# Patient Record
Sex: FEMALE | Race: WHITE | Employment: OTHER | URBAN - METROPOLITAN AREA
[De-identification: names, ages, dates, MRNs, and addresses within clinical notes are randomized per-mention and may not be internally consistent; named-entity substitution may affect disease eponyms.]

---

## 2017-04-02 ENCOUNTER — HOSPITAL ENCOUNTER (OUTPATIENT)
Age: 82
Setting detail: OBSERVATION
Discharge: HOME | End: 2017-04-03
Attending: EMERGENCY MEDICINE | Admitting: INTERNAL MEDICINE
Payer: MEDICARE

## 2017-04-02 ENCOUNTER — APPOINTMENT (OUTPATIENT)
Dept: CT IMAGING | Age: 82
End: 2017-04-02
Attending: EMERGENCY MEDICINE
Payer: MEDICARE

## 2017-04-02 DIAGNOSIS — G45.1 HEMISPHERIC CAROTID ARTERY SYNDROME: ICD-10-CM

## 2017-04-02 DIAGNOSIS — G45.9 TRANSIENT CEREBRAL ISCHEMIA, UNSPECIFIED TYPE: ICD-10-CM

## 2017-04-02 DIAGNOSIS — R29.898 RIGHT LEG WEAKNESS: Primary | ICD-10-CM

## 2017-04-02 LAB
ALBUMIN SERPL BCP-MCNC: 4.2 G/DL (ref 3.5–5)
ALBUMIN/GLOB SERPL: 1.3 {RATIO} (ref 1.1–2.2)
ALP SERPL-CCNC: 75 U/L (ref 45–117)
ALT SERPL-CCNC: 26 U/L (ref 12–78)
ANION GAP BLD CALC-SCNC: 7 MMOL/L (ref 5–15)
AST SERPL W P-5'-P-CCNC: 15 U/L (ref 15–37)
BASOPHILS # BLD AUTO: 0 K/UL (ref 0–0.1)
BASOPHILS # BLD: 0 % (ref 0–1)
BILIRUB SERPL-MCNC: 0.5 MG/DL (ref 0.2–1)
BUN SERPL-MCNC: 34 MG/DL (ref 6–20)
BUN/CREAT SERPL: 26 (ref 12–20)
CALCIUM SERPL-MCNC: 9.6 MG/DL (ref 8.5–10.1)
CHLORIDE SERPL-SCNC: 101 MMOL/L (ref 97–108)
CO2 SERPL-SCNC: 31 MMOL/L (ref 21–32)
CREAT SERPL-MCNC: 1.3 MG/DL (ref 0.55–1.02)
EOSINOPHIL # BLD: 0.3 K/UL (ref 0–0.4)
EOSINOPHIL NFR BLD: 2 % (ref 0–7)
ERYTHROCYTE [DISTWIDTH] IN BLOOD BY AUTOMATED COUNT: 13.4 % (ref 11.5–14.5)
GLOBULIN SER CALC-MCNC: 3.3 G/DL (ref 2–4)
GLUCOSE BLD STRIP.AUTO-MCNC: 116 MG/DL (ref 65–100)
GLUCOSE SERPL-MCNC: 122 MG/DL (ref 65–100)
HCT VFR BLD AUTO: 46 % (ref 35–47)
HGB BLD-MCNC: 15.1 G/DL (ref 11.5–16)
LYMPHOCYTES # BLD AUTO: 26 % (ref 12–49)
LYMPHOCYTES # BLD: 2.8 K/UL (ref 0.8–3.5)
MCH RBC QN AUTO: 32.1 PG (ref 26–34)
MCHC RBC AUTO-ENTMCNC: 32.8 G/DL (ref 30–36.5)
MCV RBC AUTO: 97.7 FL (ref 80–99)
MONOCYTES # BLD: 0.8 K/UL (ref 0–1)
MONOCYTES NFR BLD AUTO: 8 % (ref 5–13)
NEUTS SEG # BLD: 6.8 K/UL (ref 1.8–8)
NEUTS SEG NFR BLD AUTO: 64 % (ref 32–75)
PLATELET # BLD AUTO: 217 K/UL (ref 150–400)
POTASSIUM SERPL-SCNC: 3.8 MMOL/L (ref 3.5–5.1)
PROT SERPL-MCNC: 7.5 G/DL (ref 6.4–8.2)
RBC # BLD AUTO: 4.71 M/UL (ref 3.8–5.2)
SERVICE CMNT-IMP: ABNORMAL
SODIUM SERPL-SCNC: 139 MMOL/L (ref 136–145)
WBC # BLD AUTO: 10.7 K/UL (ref 3.6–11)

## 2017-04-02 PROCEDURE — 74011250637 HC RX REV CODE- 250/637: Performed by: INTERNAL MEDICINE

## 2017-04-02 PROCEDURE — 85025 COMPLETE CBC W/AUTO DIFF WBC: CPT | Performed by: EMERGENCY MEDICINE

## 2017-04-02 PROCEDURE — 93005 ELECTROCARDIOGRAM TRACING: CPT

## 2017-04-02 PROCEDURE — 96360 HYDRATION IV INFUSION INIT: CPT

## 2017-04-02 PROCEDURE — 70450 CT HEAD/BRAIN W/O DYE: CPT

## 2017-04-02 PROCEDURE — 36415 COLL VENOUS BLD VENIPUNCTURE: CPT | Performed by: EMERGENCY MEDICINE

## 2017-04-02 PROCEDURE — 74011250636 HC RX REV CODE- 250/636: Performed by: INTERNAL MEDICINE

## 2017-04-02 PROCEDURE — 84443 ASSAY THYROID STIM HORMONE: CPT | Performed by: INTERNAL MEDICINE

## 2017-04-02 PROCEDURE — 83036 HEMOGLOBIN GLYCOSYLATED A1C: CPT | Performed by: INTERNAL MEDICINE

## 2017-04-02 PROCEDURE — 99285 EMERGENCY DEPT VISIT HI MDM: CPT

## 2017-04-02 PROCEDURE — 74011250637 HC RX REV CODE- 250/637: Performed by: EMERGENCY MEDICINE

## 2017-04-02 PROCEDURE — 80053 COMPREHEN METABOLIC PANEL: CPT | Performed by: EMERGENCY MEDICINE

## 2017-04-02 PROCEDURE — 82962 GLUCOSE BLOOD TEST: CPT

## 2017-04-02 PROCEDURE — 99218 HC RM OBSERVATION: CPT

## 2017-04-02 PROCEDURE — 80061 LIPID PANEL: CPT | Performed by: INTERNAL MEDICINE

## 2017-04-02 RX ORDER — MAGNESIUM SULFATE 100 %
4 CRYSTALS MISCELLANEOUS AS NEEDED
Status: DISCONTINUED | OUTPATIENT
Start: 2017-04-02 | End: 2017-04-03 | Stop reason: HOSPADM

## 2017-04-02 RX ORDER — SIMVASTATIN 80 MG/1
80 TABLET, FILM COATED ORAL
COMMUNITY

## 2017-04-02 RX ORDER — SODIUM CHLORIDE 9 MG/ML
100 INJECTION, SOLUTION INTRAVENOUS CONTINUOUS
Status: DISCONTINUED | OUTPATIENT
Start: 2017-04-02 | End: 2017-04-03 | Stop reason: HOSPADM

## 2017-04-02 RX ORDER — VALSARTAN 80 MG/1
80 TABLET ORAL DAILY
Status: DISCONTINUED | OUTPATIENT
Start: 2017-04-03 | End: 2017-04-02 | Stop reason: CLARIF

## 2017-04-02 RX ORDER — HYDROCHLOROTHIAZIDE 25 MG/1
25 TABLET ORAL DAILY
COMMUNITY

## 2017-04-02 RX ORDER — VALSARTAN 80 MG/1
80 TABLET ORAL DAILY
COMMUNITY

## 2017-04-02 RX ORDER — DEXTROSE 50 % IN WATER (D50W) INTRAVENOUS SYRINGE
12.5-25 AS NEEDED
Status: DISCONTINUED | OUTPATIENT
Start: 2017-04-02 | End: 2017-04-03 | Stop reason: HOSPADM

## 2017-04-02 RX ORDER — CHOLECALCIFEROL TAB 125 MCG (5000 UNIT) 125 MCG
5000 TAB ORAL DAILY
COMMUNITY

## 2017-04-02 RX ORDER — MELATONIN
5000 DAILY
Status: DISCONTINUED | OUTPATIENT
Start: 2017-04-03 | End: 2017-04-03 | Stop reason: HOSPADM

## 2017-04-02 RX ORDER — HYDROCHLOROTHIAZIDE 25 MG/1
25 TABLET ORAL DAILY
Status: DISCONTINUED | OUTPATIENT
Start: 2017-04-03 | End: 2017-04-03 | Stop reason: HOSPADM

## 2017-04-02 RX ORDER — LOSARTAN POTASSIUM 50 MG/1
50 TABLET ORAL DAILY
Status: DISCONTINUED | OUTPATIENT
Start: 2017-04-03 | End: 2017-04-03 | Stop reason: HOSPADM

## 2017-04-02 RX ORDER — INSULIN LISPRO 100 [IU]/ML
INJECTION, SOLUTION INTRAVENOUS; SUBCUTANEOUS
Status: DISCONTINUED | OUTPATIENT
Start: 2017-04-02 | End: 2017-04-03 | Stop reason: HOSPADM

## 2017-04-02 RX ORDER — LORAZEPAM 0.5 MG/1
0.5 TABLET ORAL
Status: DISCONTINUED | OUTPATIENT
Start: 2017-04-02 | End: 2017-04-03 | Stop reason: HOSPADM

## 2017-04-02 RX ORDER — LORAZEPAM 0.5 MG/1
0.5 TABLET ORAL
COMMUNITY

## 2017-04-02 RX ORDER — ASPIRIN 81 MG/1
81 TABLET ORAL DAILY
Status: DISCONTINUED | OUTPATIENT
Start: 2017-04-03 | End: 2017-04-03 | Stop reason: HOSPADM

## 2017-04-02 RX ORDER — ASPIRIN 325 MG
325 TABLET ORAL
Status: COMPLETED | OUTPATIENT
Start: 2017-04-02 | End: 2017-04-02

## 2017-04-02 RX ORDER — METFORMIN HYDROCHLORIDE 500 MG/1
500 TABLET ORAL DAILY
COMMUNITY

## 2017-04-02 RX ORDER — SIMVASTATIN 40 MG/1
80 TABLET, FILM COATED ORAL
Status: DISCONTINUED | OUTPATIENT
Start: 2017-04-02 | End: 2017-04-03 | Stop reason: HOSPADM

## 2017-04-02 RX ORDER — ASPIRIN 81 MG/1
81 TABLET ORAL DAILY
COMMUNITY
End: 2017-04-03

## 2017-04-02 RX ADMIN — SODIUM CHLORIDE 100 ML/HR: 900 INJECTION, SOLUTION INTRAVENOUS at 22:25

## 2017-04-02 RX ADMIN — ASPIRIN 325 MG: 325 TABLET ORAL at 21:13

## 2017-04-02 RX ADMIN — SIMVASTATIN 80 MG: 40 TABLET, FILM COATED ORAL at 22:28

## 2017-04-02 NOTE — ED PROVIDER NOTES
HPI Comments: [de-identified] y.o. female with past medical history significant for HTN, high cholesterol who presents with chief complaint of weakness. Pt reports that while trying to get into her car at 1515 (approx 3.5 hours ago) she had right leg weakness that lasted for a brief amount of time and then resolved. She reports that at 1730 (1 hour 20 mins ago) while getting up to feed her dogs, she had right leg \"heaviness\",\"achiness\" and was unable to bear weight on the leg that lasted about 30 mins. Pt says that her right leg is still not back to baseline since ED arrival. She notes that she takes baby ASA daily. Pt denies tingling, numbness, arm weakness, speech difficulty, recent surgeries, hx of GI bleeds or bloody stools. There are no other acute medical concerns at this time. SocHx: nonsmoker  Significant FMHx: family hx of stroke, CAD, DM  PCP: Lamar Garcia MD    Note written by Iraida Evans, as dictated by Srinivasan Russell MD 6:51 PM      The history is provided by the patient. Past Medical History:   Diagnosis Date    Hypertension     Ill-defined condition     high cholesterol       History reviewed. No pertinent surgical history. History reviewed. No pertinent family history. Social History     Social History    Marital status:      Spouse name: N/A    Number of children: N/A    Years of education: N/A     Occupational History    Not on file. Social History Main Topics    Smoking status: Not on file    Smokeless tobacco: Not on file    Alcohol use Not on file    Drug use: Not on file    Sexual activity: Not on file     Other Topics Concern    Not on file     Social History Narrative         ALLERGIES: Sulfa (sulfonamide antibiotics)    Review of Systems   Constitutional: Negative for fever. HENT: Negative for facial swelling. Eyes: Negative for visual disturbance. Respiratory: Negative for chest tightness. Cardiovascular: Negative for chest pain. Gastrointestinal: Negative for abdominal pain and blood in stool. Genitourinary: Negative for dysuria. Musculoskeletal: Positive for myalgias (right leg). Negative for arthralgias. Skin: Negative for rash. Neurological: Positive for weakness (right leg, no other extremities). Negative for dizziness, speech difficulty and numbness. Hematological: Negative for adenopathy. Psychiatric/Behavioral: Negative for suicidal ideas. All other systems reviewed and are negative. Vitals:    04/02/17 1835 04/02/17 1905 04/02/17 1930 04/02/17 1952   BP: 151/73 157/53 151/65    Pulse: 74  70    Resp: 18  17    Temp: 97.7 °F (36.5 °C)   98 °F (36.7 °C)   SpO2: 95% 97% 93%    Weight: 83.9 kg (185 lb)      Height: 5' 5\" (1.651 m)               Physical Exam   Constitutional: She is oriented to person, place, and time. She appears well-developed and well-nourished. No distress. HENT:   Head: Normocephalic and atraumatic. Mouth/Throat: Oropharynx is clear and moist.   Eyes: Pupils are equal, round, and reactive to light. No scleral icterus. Neck: Normal range of motion. Neck supple. No thyromegaly present. Cardiovascular: Normal rate, regular rhythm, normal heart sounds and intact distal pulses. No murmur heard. Pulmonary/Chest: Effort normal and breath sounds normal. No respiratory distress. Abdominal: Soft. Bowel sounds are normal. She exhibits no distension. There is no tenderness. Musculoskeletal: Normal range of motion. She exhibits no edema. Neurological: She is alert and oriented to person, place, and time. 4/5 strength to RLE, 5/5 strength to LLE   Skin: Skin is warm and dry. No rash noted. She is not diaphoretic. Nursing note and vitals reviewed.    Note written by Iraida Green, as dictated by Deena Monroe MD 6:52 PM      MDM  Number of Diagnoses or Management Options  Right leg weakness:   Transient cerebral ischemia, unspecified type:   Diagnosis management comments: A: 2 episodes of RLE weakness today lasting about 30 minutes. Symptoms now resolved. VS normal.  Exam remarkable for slight drift to R leg on straight leg raise. P:  Head ct  ecg  Labs  Consult teleneurology. Pt not a tpa candidate at this time due to rapidly resolving symptoms. ED Course       Procedures        CONSULT NOTE:  6:52 PM Yoel Portillo MD spoke with Dr. Abel Barnett, Consult for teleNeurology. Discussed available diagnostic tests and clinical findings. He will evaluate the pt on the tele monitor. He agrees that pt is not a TPA candidate. CONSULT NOTE:  8:12 PM Yoel Portillo MD spoke with Dr. Russ Crowley MD, Consult for Hospitalist.  Discussed available diagnostic tests and clinical findings. Dr. Garrett Muhammad will evaluate pt for admission. 8:33 PM  Patient is being admitted to the hospital.  The results of their tests and reasons for their admission have been discussed with them and/or available family. They convey agreement and understanding for the need to be admitted and for their admission diagnosis. Consultation has been made with the inpatient physician specialist for hospitalization. LABORATORY TESTS:  Recent Results (from the past 12 hour(s))   GLUCOSE, POC    Collection Time: 04/02/17  6:38 PM   Result Value Ref Range    Glucose (POC) 116 (H) 65 - 100 mg/dL    Performed by Geovanny Syed    CBC WITH AUTOMATED DIFF    Collection Time: 04/02/17  6:54 PM   Result Value Ref Range    WBC 10.7 3.6 - 11.0 K/uL    RBC 4.71 3.80 - 5.20 M/uL    HGB 15.1 11.5 - 16.0 g/dL    HCT 46.0 35.0 - 47.0 %    MCV 97.7 80.0 - 99.0 FL    MCH 32.1 26.0 - 34.0 PG    MCHC 32.8 30.0 - 36.5 g/dL    RDW 13.4 11.5 - 14.5 %    PLATELET 548 876 - 419 K/uL    NEUTROPHILS 64 32 - 75 %    LYMPHOCYTES 26 12 - 49 %    MONOCYTES 8 5 - 13 %    EOSINOPHILS 2 0 - 7 %    BASOPHILS 0 0 - 1 %    ABS. NEUTROPHILS 6.8 1.8 - 8.0 K/UL    ABS. LYMPHOCYTES 2.8 0.8 - 3.5 K/UL    ABS. MONOCYTES 0.8 0.0 - 1.0 K/UL    ABS. EOSINOPHILS 0.3 0.0 - 0.4 K/UL    ABS. BASOPHILS 0.0 0.0 - 0.1 K/UL   METABOLIC PANEL, COMPREHENSIVE    Collection Time: 04/02/17  6:54 PM   Result Value Ref Range    Sodium 139 136 - 145 mmol/L    Potassium 3.8 3.5 - 5.1 mmol/L    Chloride 101 97 - 108 mmol/L    CO2 31 21 - 32 mmol/L    Anion gap 7 5 - 15 mmol/L    Glucose 122 (H) 65 - 100 mg/dL    BUN 34 (H) 6 - 20 MG/DL    Creatinine 1.30 (H) 0.55 - 1.02 MG/DL    BUN/Creatinine ratio 26 (H) 12 - 20      GFR est AA 48 (L) >60 ml/min/1.73m2    GFR est non-AA 39 (L) >60 ml/min/1.73m2    Calcium 9.6 8.5 - 10.1 MG/DL    Bilirubin, total 0.5 0.2 - 1.0 MG/DL    ALT (SGPT) 26 12 - 78 U/L    AST (SGOT) 15 15 - 37 U/L    Alk. phosphatase 75 45 - 117 U/L    Protein, total 7.5 6.4 - 8.2 g/dL    Albumin 4.2 3.5 - 5.0 g/dL    Globulin 3.3 2.0 - 4.0 g/dL    A-G Ratio 1.3 1.1 - 2.2     EKG, 12 LEAD, INITIAL    Collection Time: 04/02/17  6:54 PM   Result Value Ref Range    Ventricular Rate 80 BPM    Atrial Rate 80 BPM    P-R Interval 150 ms    QRS Duration 80 ms    Q-T Interval 384 ms    QTC Calculation (Bezet) 442 ms    Calculated P Axis 50 degrees    Calculated R Axis 27 degrees    Calculated T Axis 22 degrees    Diagnosis Normal sinus rhythm  No previous ECGs available          IMAGING RESULTS:  CT Results (most recent):    Results from Hospital Encounter encounter on 04/02/17   CT CODE NEURO HEAD WO CONTRAST   Narrative EXAM:  CT CODE NEURO HEAD WO CONTRAST    INDICATION:   right leg weakness    COMPARISON: None. TECHNIQUE: Unenhanced CT of the head was performed using 5 mm images. Brain and  bone windows were generated. CT dose reduction was achieved through use of a  standardized protocol tailored for this examination and automatic exposure  control for dose modulation. FINDINGS:  The ventricles and sulci are normal in size, shape and configuration and  midline. There is no significant white matter disease.  There is no intracranial  hemorrhage, extra-axial collection, mass, mass effect or midline shift. The  basilar cisterns are open. No acute infarct is identified. The bone windows  demonstrate no abnormalities. The visualized portions of the paranasal sinuses  and mastoid air cells are clear. Impression IMPRESSION: Unremarkable head CT              MEDICATIONS GIVEN:  Medications   aspirin (ASPIRIN) tablet 325 mg (not administered)       IMPRESSION:  1. Right leg weakness    2. Transient cerebral ischemia, unspecified type        PLAN:  1.  Admit to Hospitalist, TIA work up

## 2017-04-02 NOTE — ED TRIAGE NOTES
Pt presents with complaints of right leg weakness \"episodes\" that occurred twice today. Pt states the first episode occurred at 3:10pm and then again at 5:30pm each lasting approximately 30 minutes.  Pt reports continued weakness in her right leg but states that it is resolving

## 2017-04-02 NOTE — IP AVS SNAPSHOT
Current Discharge Medication List  
  
START taking these medications Dose & Instructions Dispensing Information Comments Morning Noon Evening Bedtime  
 clopidogrel 75 mg Tab Commonly known as:  PLAVIX Your last dose was: Your next dose is:    
   
   
 Dose:  75 mg Take 1 Tab by mouth daily. Quantity:  30 Tab Refills:  1 CONTINUE these medications which have NOT CHANGED Dose & Instructions Dispensing Information Comments Morning Noon Evening Bedtime  
 cholecalciferol (VITAMIN D3) 5,000 unit Tab tablet Commonly known as:  VITAMIN D3 Your last dose was: Your next dose is:    
   
   
 Dose:  5000 Units Take 5,000 Units by mouth daily. Refills:  0  
     
   
   
   
  
 hydroCHLOROthiazide 25 mg tablet Commonly known as:  HYDRODIURIL Your last dose was: Your next dose is:    
   
   
 Dose:  25 mg Take 25 mg by mouth daily. Refills:  0 LORazepam 0.5 mg tablet Commonly known as:  ATIVAN Your last dose was: Your next dose is:    
   
   
 Dose:  0.5 mg Take 0.5 mg by mouth two (2) times daily as needed for Anxiety. Refills:  0  
     
   
   
   
  
 metFORMIN 500 mg tablet Commonly known as:  GLUCOPHAGE Your last dose was: Your next dose is:    
   
   
 Dose:  500 mg Take 500 mg by mouth daily. Refills:  0  
     
   
   
   
  
 simvastatin 80 mg tablet Commonly known as:  ZOCOR Your last dose was: Your next dose is:    
   
   
 Dose:  80 mg Take 80 mg by mouth nightly. Refills:  0  
     
   
   
   
  
 valsartan 80 mg tablet Commonly known as:  DIOVAN Your last dose was: Your next dose is:    
   
   
 Dose:  80 mg Take 80 mg by mouth daily. Indications: hypertension Refills:  0 STOP taking these medications   
 aspirin delayed-release 81 mg tablet Where to Get Your Medications Information on where to get these meds will be given to you by the nurse or doctor. ! Ask your nurse or doctor about these medications  
  clopidogrel 75 mg Tab

## 2017-04-02 NOTE — PROGRESS NOTES
Spiritual Care Assessment/Progress Notes    Katie Lewis [id number]  [id number]    1935  [de-identified] y.o.  female    Patient Telephone Number: There is no home phone number on file. Worship Affiliation: Druze   Language: English   Extended Emergency Contact Information  Primary Emergency Contact: [address] Phone: [phone]  Mobile Phone: [phone]  Relation: Child   There are no active problems to display for this patient. Date: 4/2/2017       Level of Worship/Spiritual Activity:  []         Involved in elle tradition/spiritual practice    []         Not involved in elle tradition/spiritual practice  []         Spiritually oriented    []         Claims no spiritual orientation    []         seeking spiritual identity  []         Feels alienated from Samaritan practice/tradition  []         Feels angry about Samaritan practice/tradition  []         Spirituality/Samaritan tradition  a resource for coping at this time.   [x]         Not able to assess     Services Provided Today:  []         crisis intervention    []         reading Scriptures  []         spiritual assessment    []         prayer  []         empathic listening/emotional support  []         rites and rituals (cite in comments)  []         life review     []         Samaritan support  []         theological development   []         advocacy  []         ethical dialog     []         blessing  []         bereavement support    []         support to family  []         anticipatory grief support   []         help with AMD  []         spiritual guidance    []         meditation      Spiritual Care Needs  []         Emotional Support  []         Spiritual/Worship Care  []         Loss/Adjustment  []         Advocacy/Referral                /Ethics  []         No needs expressed at               this time  []         Other: (note in               comments)  [address]  []         Follow up visits with               pt/family  []         Provide materials  []         Schedule sacraments  []         Contact Community               Clergy  [x]         Follow up as needed  []         Other: (note in               comments)     Comments:  Paged for code S in ER. Was not able to spiritually assess patient while being cared for and evaluated by medical staff. Judy Blank M.S., M.Div.   [facility] (1049)

## 2017-04-02 NOTE — IP AVS SNAPSHOT
[address] 
[phone] Patient: Juan hWitney MRN: [id number] QDR:5/5/[id number] You are allergic to the following Allergen Reactions Sulfa (Sulfonamide Antibiotics) Nausea and Vomiting Recent Documentation Height Weight BMI OB Status Smoking Status 1.651 m 77.2 kg 27.47 kg/m2 Postmenopausal Never Assessed Emergency Contacts Name Discharge Info Relation Home Work Mobile Juliet Horner [2] [phone] About your hospitalization You were admitted on:  April 2, 2017 You last received care in the:  [facility] 6S NEURO-SCI TELE You were discharged on:  April 3, 2017 Unit phone number:  [phone] Why you were hospitalized Your primary diagnosis was:  Tia (Transient Ischemic Attack) 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 Providers Seen During Your Hospitalizations Provider Role Specialty Primary office phone Julio Isaacs MD Attending Provider Emergency Medicine [phone] Eric Jimenez MD Attending Provider Internal Medicine [phone] Yves Lew MD Attending Provider Internal Medicine [phone] Your Primary Care Physician (PCP) Primary Care Physician Office Phone Office Fax [address] [facility], [address] [place] [address] [phone] Follow-up Information Follow up With Details Comments Contact Info Guzman Montalvo MD In 1 week  [address] [facility] [address] [facility] 
[phone] Shital Paniagua MD In 1 week  [address] [facility] [address] [place] [address] 
[phone] Current Discharge Medication List  
  
START taking these medications Dose & Instructions Dispensing Information Comments Morning Noon Evening Bedtime  
 clopidogrel 75 mg Tab Commonly known as:  PLAVIX Your last dose was: Your next dose is:    
   
   
 Dose:  75 mg Take 1 Tab by mouth daily. Quantity:  30 Tab Refills:  1 CONTINUE these medications which have NOT CHANGED Dose & Instructions Dispensing Information Comments Morning Noon Evening Bedtime  
 cholecalciferol (VITAMIN D3) 5,000 unit Tab tablet Commonly known as:  VITAMIN D3 Your last dose was: Your next dose is:    
   
   
 Dose:  5000 Units Take 5,000 Units by mouth daily. Refills:  0  
     
   
   
   
  
 hydroCHLOROthiazide 25 mg tablet Commonly known as:  HYDRODIURIL Your last dose was: Your next dose is:    
   
   
 Dose:  25 mg Take 25 mg by mouth daily. Refills:  0 LORazepam 0.5 mg tablet Commonly known as:  ATIVAN Your last dose was: Your next dose is:    
   
   
 Dose:  0.5 mg Take 0.5 mg by mouth two (2) times daily as needed for Anxiety. Refills:  0  
     
   
   
   
  
 metFORMIN 500 mg tablet Commonly known as:  GLUCOPHAGE Your last dose was: Your next dose is:    
   
   
 Dose:  500 mg Take 500 mg by mouth daily. Refills:  0  
     
   
   
   
  
 simvastatin 80 mg tablet Commonly known as:  ZOCOR Your last dose was: Your next dose is:    
   
   
 Dose:  80 mg Take 80 mg by mouth nightly. Refills:  0  
     
   
   
   
  
 valsartan 80 mg tablet Commonly known as:  DIOVAN Your last dose was: Your next dose is:    
   
   
 Dose:  80 mg Take 80 mg by mouth daily. Indications: hypertension Refills:  0 STOP taking these medications   
 aspirin delayed-release 81 mg tablet Where to Get Your Medications Information on where to get these meds will be given to you by the nurse or doctor. ! Ask your nurse or doctor about these medications  
  clopidogrel 75 mg Tab Discharge Instructions Discharge Instructions PATIENT ID: Claribel Mejia MRN: [id number] YOB: 1935 DATE OF ADMISSION: 4/2/2017  6:40 PM   
DATE OF DISCHARGE: 4/3/2017 PRIMARY CARE PROVIDER: Law Kelly MD  
 
ATTENDING PHYSICIAN: Hamida Weston. MD Vipin 
DISCHARGING PROVIDER: Hamida Lew MD   
To contact this individual call [phone] and ask the  to page. If unavailable ask to be transferred the Adult Hospitalist Department. DISCHARGE DIAGNOSES:  
 
 
CONSULTATIONS: IP CONSULT TO HOSPITALIST 
IP CONSULT TO NEUROLOGY PROCEDURES/SURGERIES: * No surgery found * PENDING TEST RESULTS:  
At the time of discharge the following test results are still pending: None FOLLOW UP APPOINTMENTS:  
Follow-up Information Follow up With Details Comments Contact Info Law Kelly MD In 1 week  [address] [place] [address] 
[phone] Diana Lee MD In 1 week  [address] [place] [address] 
[phone] ADDITIONAL CARE RECOMMENDATIONS: *** DIET: Diabetic Diet ACTIVITY: Activity as tolerated WOUND CARE: None EQUIPMENT needed: None DISCHARGE MEDICATIONS: 
 See Medication Reconciliation Form · It is important that you take the medication exactly as they are prescribed. · Keep your medication in the bottles provided by the pharmacist and keep a list of the medication names, dosages, and times to be taken in your wallet. · Do not take other medications without consulting your doctor. NOTIFY YOUR PHYSICIAN FOR ANY OF THE FOLLOWING:  
Fever over 101 degrees for 24 hours. Chest pain, shortness of breath, fever, chills, nausea, vomiting, diarrhea, change in mentation, falling, weakness, bleeding. Severe pain or pain not relieved by medications. Or, any other signs or symptoms that you may have questions about. DISPOSITION: 
X  Home With: Family  OT  PT  [facility]  RN  
  
 SNF/Inpatient Rehab/LTAC Independent/assisted living Hospice Other: CDMP Checked: Yes X PROBLEM LIST Updated: Yes X Signed:  
Anupam Lew MD 
4/3/2017 4:45 PM 
 
Discharge Orders None [facility] [employer] Announcement We are excited to announce that we are making your provider's discharge notes available to you in [employer]. You will see these notes when they are completed and signed by the physician that discharged you from your recent hospital stay. If you have any questions or concerns about any information you see in Erecruit, please call the Health Information Department where you were seen or reach out to your Primary Care Provider for more information about your plan of care. Introducing \[facility] [place] [facility]\"" & HEALTH SERVICES! Margot Baca introduces Erecruit patient portal. Now you can access parts of your medical record, email your doctor's office, and request medication refills online. 1. In your internet browser, go to [email or web address] 2. Click on the First Time User? Click Here link in the Sign In box. You will see the New Member Sign Up page. 3. Enter your Erecruit Access Code exactly as it appears below. You will not need to use this code after youve completed the sign-up process. If you do not sign up before the expiration date, you must request a new code. · Erecruit Access Code: [email or web address] Expires: 7/1/2017  6:42 PM 
 
4. Enter the last four digits of your Social Security Number (xxxx) and Date of Birth (mm/dd/yyyy) as indicated and click Submit. You will be taken to the next sign-up page. 5. Create a Erecruit ID. This will be your Erecruit login ID and cannot be changed, so think of one that is secure and easy to remember. 6. Create a Erecruit password. You can change your password at any time. 7. Enter your Password Reset Question and Answer. This can be used at a later time if you forget your password. 8. Enter your e-mail address. You will receive e-mail notification when new information is available in [address] 9. Click Sign Up. You can now view and download portions of your medical record. 10. Click the Download Summary menu link to download a portable copy of your medical information. If you have questions, please visit the Frequently Asked Questions section of the [employer] website. Remember, [employer] is NOT to be used for urgent needs. For medical emergencies, dial 911. Now available from your iPhone and Android! General Information Please provide this summary of care documentation to your next provider. Patient Signature:  ____________________________________________________________ Date:  ____________________________________________________________  
  
Hansa Belfry Provider Signature:  ____________________________________________________________ Date:  ____________________________________________________________

## 2017-04-02 NOTE — ED NOTES
Assumed care of pt. Plan of care discussed. Call bell in Reach. Family at bedside providing support. Will continue to monitor.

## 2017-04-03 ENCOUNTER — APPOINTMENT (OUTPATIENT)
Dept: MRI IMAGING | Age: 82
End: 2017-04-03
Attending: PSYCHIATRY & NEUROLOGY
Payer: MEDICARE

## 2017-04-03 VITALS
HEIGHT: 65 IN | HEART RATE: 65 BPM | TEMPERATURE: 97.5 F | WEIGHT: 170.19 LBS | BODY MASS INDEX: 28.36 KG/M2 | OXYGEN SATURATION: 96 % | SYSTOLIC BLOOD PRESSURE: 132 MMHG | RESPIRATION RATE: 15 BRPM | DIASTOLIC BLOOD PRESSURE: 54 MMHG

## 2017-04-03 PROBLEM — G45.9 TIA (TRANSIENT ISCHEMIC ATTACK): Status: ACTIVE | Noted: 2017-04-03

## 2017-04-03 PROBLEM — G45.1 HEMISPHERIC CAROTID ARTERY SYNDROME: Status: ACTIVE | Noted: 2017-04-02

## 2017-04-03 LAB
ATRIAL RATE: 80 BPM
CALCULATED P AXIS, ECG09: 50 DEGREES
CALCULATED R AXIS, ECG10: 27 DEGREES
CALCULATED T AXIS, ECG11: 22 DEGREES
CHOLEST SERPL-MCNC: 178 MG/DL
DIAGNOSIS, 93000: NORMAL
EST. AVERAGE GLUCOSE BLD GHB EST-MCNC: 148 MG/DL
GLUCOSE BLD STRIP.AUTO-MCNC: 122 MG/DL (ref 65–100)
GLUCOSE BLD STRIP.AUTO-MCNC: 132 MG/DL (ref 65–100)
GLUCOSE BLD STRIP.AUTO-MCNC: 140 MG/DL (ref 65–100)
HBA1C MFR BLD: 6.8 % (ref 4.2–6.3)
HDLC SERPL-MCNC: 54 MG/DL
HDLC SERPL: 3.3 {RATIO} (ref 0–5)
LDLC SERPL CALC-MCNC: 81 MG/DL (ref 0–100)
LIPID PROFILE,FLP: ABNORMAL
P-R INTERVAL, ECG05: 150 MS
Q-T INTERVAL, ECG07: 384 MS
QRS DURATION, ECG06: 80 MS
QTC CALCULATION (BEZET), ECG08: 442 MS
SERVICE CMNT-IMP: ABNORMAL
TRIGL SERPL-MCNC: 215 MG/DL (ref ?–150)
TSH SERPL DL<=0.05 MIU/L-ACNC: 1.69 UIU/ML (ref 0.36–3.74)
VENTRICULAR RATE, ECG03: 80 BPM
VLDLC SERPL CALC-MCNC: 43 MG/DL

## 2017-04-03 PROCEDURE — G8979 MOBILITY GOAL STATUS: HCPCS

## 2017-04-03 PROCEDURE — 82962 GLUCOSE BLOOD TEST: CPT

## 2017-04-03 PROCEDURE — G8978 MOBILITY CURRENT STATUS: HCPCS

## 2017-04-03 PROCEDURE — 93880 EXTRACRANIAL BILAT STUDY: CPT

## 2017-04-03 PROCEDURE — 93306 TTE W/DOPPLER COMPLETE: CPT

## 2017-04-03 PROCEDURE — 99218 HC RM OBSERVATION: CPT

## 2017-04-03 PROCEDURE — 70544 MR ANGIOGRAPHY HEAD W/O DYE: CPT

## 2017-04-03 PROCEDURE — 74011250636 HC RX REV CODE- 250/636: Performed by: INTERNAL MEDICINE

## 2017-04-03 PROCEDURE — G8980 MOBILITY D/C STATUS: HCPCS

## 2017-04-03 PROCEDURE — 74011250637 HC RX REV CODE- 250/637: Performed by: INTERNAL MEDICINE

## 2017-04-03 PROCEDURE — A9585 GADOBUTROL INJECTION: HCPCS | Performed by: INTERNAL MEDICINE

## 2017-04-03 PROCEDURE — 70553 MRI BRAIN STEM W/O & W/DYE: CPT

## 2017-04-03 PROCEDURE — 97161 PT EVAL LOW COMPLEX 20 MIN: CPT

## 2017-04-03 RX ORDER — CLOPIDOGREL BISULFATE 75 MG/1
75 TABLET ORAL DAILY
Qty: 30 TAB | Refills: 1 | Status: SHIPPED | OUTPATIENT
Start: 2017-04-03

## 2017-04-03 RX ORDER — SODIUM CHLORIDE 0.9 % (FLUSH) 0.9 %
10 SYRINGE (ML) INJECTION
Status: COMPLETED | OUTPATIENT
Start: 2017-04-03 | End: 2017-04-03

## 2017-04-03 RX ADMIN — HYDROCHLOROTHIAZIDE 25 MG: 25 TABLET ORAL at 08:34

## 2017-04-03 RX ADMIN — LOSARTAN POTASSIUM 50 MG: 50 TABLET ORAL at 08:34

## 2017-04-03 RX ADMIN — VITAMIN D, TAB 1000IU (100/BT) 5000 UNITS: 25 TAB at 08:33

## 2017-04-03 RX ADMIN — Medication 10 ML: at 10:33

## 2017-04-03 RX ADMIN — ASPIRIN 81 MG: 81 TABLET, COATED ORAL at 08:35

## 2017-04-03 RX ADMIN — GADOBUTROL 7.5 ML: 604.72 INJECTION INTRAVENOUS at 10:33

## 2017-04-03 NOTE — PROGRESS NOTES
Problem: TIA: First 24 hours  Goal: Consults, if ordered  Outcome: Progressing Towards Goal  Consults pending  Goal: Diagnostic Test/Procedures  Outcome: Progressing Towards Goal  Testing in progress  Goal: Discharge Planning  Outcome: Progressing Towards Goal  Discharge planning initated

## 2017-04-03 NOTE — ED NOTES
Pt ambulatory to bathroom with steady gait, family notified of room assignment and provided with gingerale and crackers.

## 2017-04-03 NOTE — PROGRESS NOTES
Primary Nurse Cookie Victor RN performed a dual skin assessment on this patient No impairment noted  Simone score is 23

## 2017-04-03 NOTE — PROGRESS NOTES
Transition RN to the Bedside to assist Primary RN with patient education, medication educations, discharge instructions, and stroke core measure compliance. Discharge concerns initiated and discussed with patient, including clarification on \"who\" assists the patient at their home and instructions for when the home going patient should call their provider after discharge. Opportunity for questions and clarification was provided. 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 \"  Barriers to Education: none  Diagnosis Education given:  YES    Length of stay: 0  Expected Day of Discharge: TBD    Education Day #: 1    Medication Education Given:  YES  M in the box Medication name: ASA      Stroke Education documented in Patient Education: YES  Core Measures Documented in [facility]:  Risk Factors: YES  Warning signs of stroke: YES  When to Activate 911: YES  Medication Education for Risk Factors: YES  Smoking cessation if applicable: YES  Written Education Given:  YES    Discharge NIH Completed: YES  Score: 0    BRAINS: NO    Follow Up Appointment Made: NO  Date/Time if applicable: TBD

## 2017-04-03 NOTE — PROGRESS NOTES
5078 - Pt up to bathroom, states \"that feeling is back in my right leg\". Asked pt to describe; stated \"it is difficult to explain, my leg feels heavy\". Denies numbness and tingling. Denies pain or weakness. Good pulses noted groin, knee, and foot. Complained of strange feeling. Starts behind right knee with a pulling sensation that then goes to her right thigh to hip. Will notify MD.    Bedside and Verbal shift change report given to [address] (oncoming nurse) by Daniel Duran RN (offgoing nurse). Report included the following information SBAR, Kardex, ED Summary, Procedure Summary, Intake/Output, MAR, Recent Results and Cardiac Rhythm SR/SB.

## 2017-04-03 NOTE — H&P
[address] [facility] [place] [address]   HISTORY AND PHYSICAL       Name:  Roni Weiss   MR#:  [id number]   :  1935   Account #:  [de-identified]        Date of Adm:  2017       PRIMARY CARE PHYSICIAN: Allie Vuong MD    SOURCE OF INFORMATION: The patient. CHIEF COMPLAINT: Right leg weakness. HISTORY OF PRESENT ILLNESS: This is an 72-year-old woman with   a past medical history significant for hypertension, type 2 diabetes,   dyslipidemia, who was in her usual state of health until this afternoon   at about 3 p.m. when the patient developed right leg weakness. This   episode lasted about 30 minutes and resolved without any intervention. The weakness was not associated with pain. Two hours later, the   patient developed another episode of right leg weakness. This   episode also lasted about 30 minutes. She was brought to the   emergency room for further evaluation. She developed the last episode   while trying to feed the dogs. There was no injury to the leg. The   patient did not have any other extremity weakness. No difficulty with   speech. No headache or blurring of vision. The patient has no prior   history of a TIA or CVA. When the patient arrived at the emergency   room, CT scan of the head was obtained. This was negative for acute   pathology. The neurologist was consulted through the teleneurology   service. The neurologist advised admission to the hospitalist service   for further workup of suspected TIA. The patient was then referred to   the hospitalist service for that purpose. He takes a baby aspirin daily. The patient had 2 baby aspirin's today before coming to the emergency   room. She had no fever, no rigors and no chills. PAST MEDICAL HISTORY: Type 2 diabetes, hypertension,   dyslipidemia. ALLERGIES: THE PATIENT IS ALLERGIC TO SULFA. MEDICATIONS   1. Aspirin 81 mg daily. 2. Hydrochlorothiazide 25 mg daily.    3. Ativan 0.5 mg twice daily as needed for anxiety. 4. Glucophage 500 mg daily. 5. Zocor 80 mg daily at bedtime. 6. Diovan 80 mg daily. FAMILY HISTORY: This was reviewed. Her mother had a stroke and   diabetes. PAST SURGICAL HISTORY: Not significant. SOCIAL HISTORY: No history of alcohol or tobacco abuse. REVIEW OF SYSTEMS   HEAD, EYES, EARS, NOSE AND THROAT: No headache, no   dizziness, no blurring of vision, no photophobia. RESPIRATORY: No cough, no shortness of breath, no hemoptysis. CARDIOVASCULAR: No chest pain, no orthopnea, no palpitation. GASTROINTESTINAL: No nausea, vomiting, no diarrhea, no   constipation. GENITOURINARY: No dysuria, no urgency, and no frequency. All   other systems are reviewed and they are negative. PHYSICAL EXAMINATION   GENERAL APPEARANCE: The patient appeared ill, in moderate   distress. VITAL SIGNS: On arrival at the emergency room, temperature 97.7,   pulse 74, respiratory rate 18. Blood pressure 151/73, oxygen   saturation 95% on room air. HEAD: Normocephalic, atraumatic. EYES: Normal eye movements. No redness, no drainage, no distress. EARS: Normal external ears with no obvious drainage. NOSE: No deformity, no drainage. MOUTH AND THROAT: No visible oral lesions. Dry oral mucosa. NECK: Supple. No JVD. No thyromegaly. CHEST: Clear breath sounds. No wheezing, no crackles. HEART: Normal S1 and S2, regular. No clinically appreciable murmur. ABDOMEN: Soft, nontender, normal bowel sounds. CENTRAL NERVOUS SYSTEM:  Alert, oriented x3, no gross focal   neurological deficits. EXTREMITIES: No edema. Pulses 2+ bilaterally. MUSCULOSKELETAL: No obvious joint deformity or swelling. No calf   tenderness. PSYCHIATRIC: Normal mood and affect. LYMPHATIC: No cervical lymphadenopathy. SKIN: No active skin   lesions seen in the exposed parts of the body. DIAGNOSTIC DATA: EKG shows normal sinus rhythm. No ST and T-  wave abnormalities.  CT scan of the head without contrast negative. LABORATORY DATA: Chemistry:  Sodium 139, potassium 3.8,   chloride 101, CO2 31, glucose 122, BUN 34, creatinine 1.30, calcium   9.6, bilirubin total 0.5, ALT 26, AST 15, alkaline phosphatase 75, total   protein 7.5, albumin level 4.2, globulin  3.3. Hematology: WBC 10.7,   hemoglobin 15.1, hematocrit 46.0, platelet count 238. ASSESSMENT   1. Suspected transient ischemic attack. 2. Hypertension. 3. Type 2 diabetes. 4. Dyslipidemia. PLAN   1. Suspected transient ischemic attack. We will admit the patient for   further evaluation and treatment. We will place the patient on   observation for further evaluation of a TIA. Will obtain an MRI, MRA of   the brain, carotid Doppler of the neck, as well as echocardiogram. Will   continue with aspirin. Will check a lipid profile. Will await further   recommendation from the inpatient neurologist.   2. Hypertension. We will resume preadmission medication. We will   monitor the patient's blood pressure closely. We will check a TSH   level. 3. Type 2 diabetes. We will place the patient on sliding scale with   insulin coverage. Will check hemoglobin A1c level. We will hold oral   agent to the time of discharge from the hospital.   4. Dyslipidemia. We will check a lipid panel. We will resume   preadmission medication. 5. Other issues, CODE STATUS:  THE PATIENT IS A FULL CODE. We will place the patient on heparin for DVT prophylaxis. If the   patient's MRI of the brain is negative, the patient may require an MRI   of the lumbar spine to evaluate the patient for radiculopathy as a   possible cause of the right leg weakness.         Doyle Blank MD      RE / DV   D:  04/02/2017   20:44   T:  04/02/2017   21:33   Job #:  [id number]

## 2017-04-03 NOTE — PROCEDURES
[address]  *** FINAL REPORT ***    Name: Enma Lopez  MRN: [id number]    Outpatient  : 1935  HIS Order #: [id number]  [address] [facility] Visit #: [id number]  Date: 2017    TYPE OF TEST: Cerebrovascular Duplex    REASON FOR TEST  Transient ischemic attacks    Right Carotid:-             Proximal               Mid                 Distal  cm/s  Systolic  Diastolic  Systolic  Diastolic  Systolic  Diastolic  CCA:     70.5       9.0                            55.0       9.0  Bulb:  ECA:    110.0      10.0  ICA:     53.0      12.0                            62.0      18.0  ICA/CCA:  1.0       1.3    ICA Stenosis:    Right Vertebral:-  Finding: Antegrade  Sys:       59.0  Chiquita:       12.0    Right Subclavian:    Left Carotid:-            Proximal                Mid                 Distal  cm/s  Systolic  Diastolic  Systolic  Diastolic  Systolic  Diastolic  CCA:     06.4      20.0                            65.0      10.0  Bulb:  ECA:     82.0       8.0  ICA:     65.0      13.0                            53.0      16.0  ICA/CCA:  1.0       1.3    ICA Stenosis:    Left Vertebral:-  Finding:  Sys:       62.0  Chiquita:       17.0    Left Subclavian:    INTERPRETATION/FINDINGS  PROCEDURE:  Color duplex ultrasound imaging of extracranial  cerebrovascular arteries. FINDINGS:       Right:  Internal carotid velocity is within normal limits. There   is narrowing of the flow channel on color Doppler imaging and non-  calcific plaque on B-mode imaging, consistent with less than 50  percent stenosis. The common and external carotid arteries are patent   and without evidence of hemodynamically significant stenosis. Left:    Internal carotid velocity is within normal limits. There is narrowing of the flow channel on color Doppler imaging and  non-calcific plaque on B-mode imaging, consistent with less than 50  percent stenosis.   The common and external carotid arteries are patent   and without evidence of hemodynamically significant stenosis. IMPRESSION:  Consistent with  less than 50% stenosis of the right  internal carotid and less than 50% stenosis of the left internal  carotid. Vertebrals are patent with antegrade flow. ADDITIONAL COMMENTS    I have personally reviewed the data relevant to the interpretation of  this  study. TECHNOLOGIST: Jayy Licona.  Lynne Vidal  Signed: 04/03/2017 09:24 AM    PHYSICIAN: Jordin Hanna MD  Signed: 04/03/2017 01:59 PM

## 2017-04-03 NOTE — INTERDISCIPLINARY ROUNDS
IDR/SLIDR Summary          Patient: Amber Sevilla MRN: [id number]    Age: [de-identified] y.o. YOB: 1935 Room/Bed: [facility]   Admit Diagnosis: TIA (transient ischemic attack)  Principal Diagnosis: TIA (transient ischemic attack)   Goals: complete tests  Readmission: NO  Quality Measure: Not applicable  VTE Prophylaxis: Not needed  Influenza Vaccine screening completed? YES  Pneumococcal Vaccine screening completed? YES  Mobility needs: No   Nutrition plan:No  Consults:P.T, O.T. and Case Management    Financial concerns:Yes  Escalated to CM? YES  RRAT Score:    Interventions:H2H  Testing due for pt today?  YES  LOS: 0 days Expected length of stay  days  Discharge plan: Home w/ son   PCP: Rancho Stanley MD  Transportation needs: No    Days before discharge:one day until discharge   Discharge disposition: Home    Signed:     Luciana Lyle RN  4/3/2017  2:42 AM

## 2017-04-03 NOTE — PROGRESS NOTES
physical Therapy neuro EVALUATION/discharge     Patient: Mehul Lewis (14 y.o. female)  Date: 4/3/2017  Primary Diagnosis: TIA (transient ischemic attack)        Precautions:       ASSESSMENT :  Chart reviewed. Patient cleared to be seen by nursing staff. Pt A & O x 3, responding to commands. Patient having some RLE \"heaviness\" this AM and upon admission which has completely resolved at this time. However even with symptoms, patient ambulating well without assist. No unilateral weakness upon exam. No numbness or tingling reported at this time. No visual deficits. Pt and family educated on BE FAST signs and symptoms and to be mindful of any furthur neurological episodes. Handout given. Pt ambulating x 200 with distant supervision. Pt scored 52/56 on the Jean-Baptiste balance test, indicating low fall risk. Pt is approaching functional baseline. No further PT needs at this time. Nursing notified. Call light within reach. Please re-consult should functional status change. Skilled physical therapy is not indicated at this time. PLAN :  Discharge Recommendations: None  Further Equipment Recommendations for Discharge: none       SUBJECTIVE:   Patient stated [facility] I've never had anything happen like this before.     OBJECTIVE DATA SUMMARY:   HISTORY:    Past Medical History:   Diagnosis Date    Hypertension     Ill-defined condition     high cholesterol   History reviewed. No pertinent surgical history. Prior Level of Function/Home Situation: Pt lives with son on the first floor of a 2 level house. Previously ambulating out in the community independently without device. No falls.    Personal factors and/or comorbidities impacting plan of care:     Home Situation  Home Environment: Private residence  # Steps to Enter: 3  Rails to Enter: No  One/Two Story Residence: Two story, live on 1st floor  Living Alone: No  Support Systems: Child(rosalino), Family member(s)  Patient Expects to be Discharged to[de-identified] Private residence  Current DME Used/Available at Home: None    EXAMINATION/PRESENTATION/DECISION MAKING:   Critical Behavior:  Neurologic State: Alert  Orientation Level: Oriented X4  Cognition: Appropriate decision making, Appropriate for age attention/concentration, Appropriate safety awareness, Follows commands     Hearing: Auditory  Auditory Impairment: None     Strength:    Strength: Within functional limits     Tone & Sensation:   Tone: Normal   Sensation: Intact    Coordination:  Coordination: Within functional limits  Functional Mobility:  Transfers:  Sit to Stand: Independent  Stand to Sit: Independent  Bed to Chair: Independent  Balance:   Sitting: Intact  Standing: Intact  Ambulation/Gait Training:  Distance (ft): 200 Feet (ft)  Assistive Device: Gait belt  Ambulation - Level of Assistance: Supervision  Base of Support: Narrowed   Speed/Lakesha: Slow    Functional Measure:  Jean-Baptiste Balance Test:    Sitting to Standing: 3  Standing Unsupported: 4  Sitting with Back Unsupported: 4  Standing to Sittin  Transfers: 4  Standing Unsupported with Eyes Closed: 4  Standing Unsupported with Feet Together: 4  Reach Forward with Outstretched Arm: 3   Object: 4  Turn to Look Over Shoulders: 4  Turn 360 Degrees: 4  Alternate Foot on Step/Stool: 4  Standing Unsupported One Foot in Front: 3  Stand on One Leg: 3  Total: 52         56=Maximum possible score;   0-20=High fall risk  21-40=Moderate fall risk   41-56=Low fall risk     Jean-Baptiste Balance Test and G-code impairment scale:  Percentage of Impairment CH    0%   CI    1-19% CJ    20-39% CK    40-59% CL    60-79% CM    80-99% CN     100%   Jean-Baptiste   Score 0-56 56 45-55 34-44 23-33 12-22 1-11 0     G codes: In compliance with CMSs Claims Based Outcome Reporting, the following G-code set was chosen for this patient based on their primary functional limitation being treated:     The outcome measure chosen to determine the severity of the functional limitation was the Navarrete with a score of 52/56 which was correlated with the impairment scale. ? Mobility - Walking and Moving Around:     - CURRENT STATUS: CI - 1%-19% impaired, limited or restricted    - GOAL STATUS: CI - 1%-19% impaired, limited or restricted    - D/C STATUS:  CI - 1%-19% impaired, limited or restricted      Physical Therapy Evaluation Charge Determination   History Examination Presentation Decision-Making   LOW Complexity : Zero comorbidities / personal factors that will impact the outcome / POC LOW Complexity : 1-2 Standardized tests and measures addressing body structure, function, activity limitation and / or participation in recreation  LOW Complexity : Stable, uncomplicated  LOW Complexity : FOTO score of       Based on the above components, the patient evaluation is determined to be of the following complexity level: LOW     Pain:  Pain Scale 1: Numeric (0 - 10)  Pain Intensity 1: 0              Activity Tolerance:   WFL  Please refer to the flowsheet for vital signs taken during this treatment. After treatment:   [x]         Patient left in no apparent distress sitting up in chair  []         Patient left in no apparent distress in bed  [x]         Call bell left within reach  [x]         Nursing notified  [x]         Caregiver present  []         Bed alarm activated    COMMUNICATION/EDUCATION:   Patient was educated regarding Her deficit(s) of resolved RLE weakness as this relates to Her diagnosis of TIA. She demonstrated Good understanding as evidenced by teaching back the BEFAST signs and symptoms. Patient and/or family was verbally educated on the BE FAST acronym for signs/symptoms of CVA and TIA. BE FAST was written on patient's communication board  for visual education and reinforcement. All questions answered with patient indicating good understanding.      [x]   Fall prevention education was provided and the patient/caregiver indicated understanding. [x]   Patient/family have participated as able and agree with findings and recommendations. [x]   Patient is unable to participate in plan of care at this time.     Findings and recommendations were discussed with: Registered Nurse    Thank you for this referral.  Whitley Walters PT, DPT   Time Calculation: 10 mins

## 2017-04-03 NOTE — ROUTINE PROCESS
TRANSFER - OUT REPORT:    Verbal report given to [employer] (name) on Jose Kulkarni  being transferred to 6S (unit) for routine progression of care       Report consisted of patients Situation, Background, Assessment and   Recommendations(SBAR). Information from the following report(s) SBAR, ED Summary, MAR, Recent Results, Med Rec Status and Cardiac Rhythm NSR was reviewed with the receiving nurse. Lines:   Peripheral IV 04/02/17 Right Antecubital (Active)   Site Assessment Clean, dry, & intact 4/2/2017  6:52 PM   Phlebitis Assessment 0 4/2/2017  6:52 PM   Infiltration Assessment 0 4/2/2017  6:52 PM   Dressing Status Clean, dry, & intact 4/2/2017  6:52 PM   Dressing Type Transparent 4/2/2017  6:52 PM        Opportunity for questions and clarification was provided.       Patient transported with:   Monitor  Tech

## 2017-04-03 NOTE — PROGRESS NOTES
I have reviewed discharge instructions with the patient. The patient verbalized understanding. Stroke education binder given to patient. Education regarding new medication plavix also given to patient. All questions clarified.

## 2017-04-03 NOTE — DISCHARGE SUMMARY
Discharge Summary       PATIENT ID: Benjie Ferreira  MRN: [id number]   YOB: 1935    DATE OF ADMISSION: 4/2/2017  6:40 PM    DATE OF DISCHARGE: 04/03/17   PRIMARY CARE PROVIDER: Tio Trujillo MD     ATTENDING PHYSICIAN: Kathy Monet. MD Vipin  DISCHARGING PROVIDER: Kathy Lew MD    To contact this individual call [phone] and ask the  to page. If unavailable ask to be transferred the Adult Hospitalist Department. CONSULTATIONS: IP CONSULT TO HOSPITALIST  IP CONSULT TO NEUROLOGY    PROCEDURES/SURGERIES: * No surgery found *    ADMITTING DIAGNOSES & HOSPITAL COURSE:   HISTORY OF PRESENT ILLNESS: This is an 82-year-old woman with   a past medical history significant for hypertension, type 2 diabetes,   dyslipidemia, who was in her usual state of health until this afternoon   at about 3 p.m. when the patient developed right leg weakness. This   episode lasted about 30 minutes and resolved without any intervention. The weakness was not associated with pain. Two hours later, the   patient developed another episode of right leg weakness. This   episode also lasted about 30 minutes. She was brought to the   emergency room for further evaluation. She developed the last episode   while trying to feed the dogs. There was no injury to the leg. The   patient did not have any other extremity weakness. No difficulty with   speech. No headache or blurring of vision. The patient has no prior   history of a TIA or CVA. When the patient arrived at the emergency   room, CT scan of the head was obtained. This was negative for acute   pathology. The neurologist was consulted through the teleneurology   service. The neurologist advised admission to the hospitalist service   for further workup of suspected TIA. The patient was then referred to   the hospitalist service for that purpose. He takes a baby aspirin daily.    The patient had 2 baby aspirin's today before coming to the emergency   room. She had no fever, no rigors and no chills. DISCHARGE DIAGNOSES / PLAN:      1. Possible TIA or PVD of the LE:   -Pt remained neurologically stable during her stay and weakness/pain in the Rt LE resolved. Strength in her in bilateral LE was 5/5.   -Pt had an MRI/MRA of the brain done which came back unremarkable.   -She also had a carotid duplex and 2D echocardiogram which was also unremarkable.   -I suspect her symptom may be due to PVD of the Rt LE. I have advised her to follow up with her cardiologist, Dr Flory Del Cid for vascular studies of the lower extremities. -Pt was also seen by the neurology service and they recommend switching from ASA to Plavix. 2. Hypertension:   -BP remained stable during her hospital stay. She was continued on her usual dose of HCTZ and ARB. 3. Type 2 diabetes:  -Pt was managed with insulin sliding scale during her stay. -Will resume Metformin on discharge.   -HbA1c was 6.8.    4. Dyslipidemia:   -Pt was continued on her usual dose of statin. Lipid panel was unremarkable. PENDING TEST RESULTS:   At the time of discharge the following test results are still pending: None    FOLLOW UP APPOINTMENTS:    Follow-up Information     Follow up With Details Comments 31 Cayla Muller MD In 1 week  [address] [facility]      Korin Soto MD In 1 week  [address]  [phone]             ADDITIONAL CARE RECOMMENDATIONS:  Follow up with your cardiologist, Dr Flory Del Cid for vascular studies of your lower extremities. DIET: Diabetic Diet    ACTIVITY: Activity as tolerated    WOUND CARE: None    EQUIPMENT needed: None      DISCHARGE MEDICATIONS:  Current Discharge Medication List      START taking these medications    Details   clopidogrel (PLAVIX) 75 mg tab Take 1 Tab by mouth daily.   Qty: 30 Tab, Refills: 1         CONTINUE these medications which have NOT CHANGED    Details   metFORMIN (GLUCOPHAGE) 500 mg tablet Take 500 mg by mouth daily. simvastatin (ZOCOR) 80 mg tablet Take 80 mg by mouth nightly. valsartan (DIOVAN) 80 mg tablet Take 80 mg by mouth daily. Indications: hypertension      hydroCHLOROthiazide (HYDRODIURIL) 25 mg tablet Take 25 mg by mouth daily. LORazepam (ATIVAN) 0.5 mg tablet Take 0.5 mg by mouth two (2) times daily as needed for Anxiety. cholecalciferol, VITAMIN D3, (VITAMIN D3) 5,000 unit tab tablet Take 5,000 Units by mouth daily. STOP taking these medications       aspirin delayed-release 81 mg tablet Comments:   Reason for Stopping:                 NOTIFY YOUR PHYSICIAN FOR ANY OF THE FOLLOWING:   Fever over 101 degrees for 24 hours. Chest pain, shortness of breath, fever, chills, nausea, vomiting, diarrhea, change in mentation, falling, weakness, bleeding. Severe pain or pain not relieved by medications. Or, any other signs or symptoms that you may have questions about. DISPOSITION:  X  Home With: Family   OT  PT  HH  RN       Long term SNF/Inpatient Rehab    Independent/assisted living    Hospice    Other:       PATIENT CONDITION AT DISCHARGE:     Functional status    Poor     Deconditioned    X Independent      Cognition   X  Lucid     Forgetful     Dementia      Catheters/lines (plus indication)    Gaines     PICC     PEG    X None      Code status   X  Full code     DNR      PHYSICAL EXAMINATION AT DISCHARGE:  GENERAL APPEARANCE: Very pleasant female. NAD  HEAD: Normocephalic, atraumatic. EYES: Normal eye movements. No redness, no drainage, no distress. EARS: Normal external ears with no obvious drainage. NOSE: No deformity, no drainage. MOUTH AND THROAT: No visible oral lesions. Dry oral mucosa. NECK: Supple. No JVD. No thyromegaly. CHEST: Clear breath sounds. No wheezing, no crackles. HEART: Normal S1 and S2, regular. No clinically appreciable murmur.    ABDOMEN: Soft, nontender, normal bowel sounds. CENTRAL NERVOUS SYSTEM: Alert, oriented x3, no gross focal   neurological deficits. EXTREMITIES: No edema. Pulses 2+ bilaterally. MUSCULOSKELETAL: No obvious joint deformity or swelling. No calf   tenderness. PSYCHIATRIC: Normal mood and affect. LYMPHATIC: No cervical lymphadenopathy. SKIN: No active skin   lesions seen in the exposed parts of the body. 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

## 2017-04-03 NOTE — DISCHARGE INSTRUCTIONS
Discharge Instructions       PATIENT ID: Shannan Camejo  MRN: [id number]   YOB: 1935    DATE OF ADMISSION: 4/2/2017  6:40 PM    DATE OF DISCHARGE: 4/3/2017    PRIMARY CARE PROVIDER: Deidra Cain MD     ATTENDING PHYSICIAN: Herber Mendez. MD [place]  DISCHARGING PROVIDER: Herber Lew MD    To contact this individual call [phone] and ask the  to page. If unavailable ask to be transferred the Adult Hospitalist Department. DISCHARGE DIAGNOSES:       CONSULTATIONS: IP CONSULT TO HOSPITALIST  IP CONSULT TO NEUROLOGY    PROCEDURES/SURGERIES: * No surgery found *    PENDING TEST RESULTS:   At the time of discharge the following test results are still pending: None    FOLLOW UP APPOINTMENTS:   Follow-up Information     Follow up With Details Comments 31 Cayla Muller MD In 1 week  [facility]      Jose C Kincaid MD In 1 week  [address]  [phone]             ADDITIONAL CARE RECOMMENDATIONS: ***    DIET: Diabetic Diet     ACTIVITY: Activity as tolerated    WOUND CARE: None    EQUIPMENT needed: None      DISCHARGE MEDICATIONS:   See Medication Reconciliation Form    · It is important that you take the medication exactly as they are prescribed. · Keep your medication in the bottles provided by the pharmacist and keep a list of the medication names, dosages, and times to be taken in your wallet. · Do not take other medications without consulting your doctor. NOTIFY YOUR PHYSICIAN FOR ANY OF THE FOLLOWING:   Fever over 101 degrees for 24 hours. Chest pain, shortness of breath, fever, chills, nausea, vomiting, diarrhea, change in mentation, falling, weakness, bleeding. Severe pain or pain not relieved by medications. Or, any other signs or symptoms that you may have questions about.       DISPOSITION:  X  Home With: Family   OT  PT  [facility]  RN SNF/Inpatient Rehab/LTAC    Independent/assisted living    Hospice    Other:     CDMP Checked: Yes X     PROBLEM LIST Updated: Yes X       Signed:   Lisandro Walter.  MD [place]  4/3/2017  4:45 PM

## 2017-04-03 NOTE — CONSULTS
NEUROLOGY  4/3/2017     Consulted by: Gloria Asencio. MD Vipin        Patient ID:  Olvin Dickerson  [id number]  [de-identified] y.o.  1935    Chief Complaint   Patient presents with    Extremity Weakness       HPI    Ms. Pao Love is an 73-year-old woman with a history of hypertension, diabetes, dyslipidemia who comes to the hospital with transient recurrent right leg weakness yesterday. No pain. Right leg felt heavy. She now feels back to her baseline. No arm or vision symptoms. No headache. MRI was done and on my preliminary view there may be a small focus of restricted diffusion in the left frontal lobe. Official report is neg. TTE shows a conduction delay. LDL is 81 and her carotid ultrasound is unrevealing. She is compliant with daily aspirin 81 mg. Review of Systems   Neurological: Positive for focal weakness. All other systems reviewed and are negative. Past Medical History:   Diagnosis Date    Hypertension     Ill-defined condition     high cholesterol     History reviewed. No pertinent family history. Social History     Social History    Marital status:      Spouse name: N/A    Number of children: N/A    Years of education: N/A     Occupational History    Not on file.      Social History Main Topics    Smoking status: Not on file    Smokeless tobacco: Not on file    Alcohol use Not on file    Drug use: Not on file    Sexual activity: Not on file     Other Topics Concern    Not on file     Social History Narrative     Current Facility-Administered Medications   Medication Dose Route Frequency    aspirin delayed-release tablet 81 mg  81 mg Oral DAILY    cholecalciferol (VITAMIN D3) tablet 5,000 Units  5,000 Units Oral DAILY    hydroCHLOROthiazide (HYDRODIURIL) tablet 25 mg  25 mg Oral DAILY    LORazepam (ATIVAN) tablet 0.5 mg  0.5 mg Oral BID PRN    simvastatin (ZOCOR) tablet 80 mg  80 mg Oral QHS    0.9% sodium chloride infusion  100 mL/hr IntraVENous CONTINUOUS    insulin lispro (HUMALOG) injection   SubCUTAneous AC&HS    glucose chewable tablet 16 g  4 Tab Oral PRN    dextrose (D50W) injection syrg 12.5-25 g  12.5-25 g IntraVENous PRN    glucagon (GLUCAGEN) injection 1 mg  1 mg IntraMUSCular PRN    losartan (COZAAR) tablet 50 mg  50 mg Oral DAILY     Allergies   Allergen Reactions    Sulfa (Sulfonamide Antibiotics) Nausea and Vomiting       Visit Vitals    /70 (BP 1 Location: Right arm, BP Patient Position: At rest)    Pulse 65    Temp 97.5 °F (36.4 °C)    Resp 20    Ht 5' 5\" (1.651 m)    Wt 77.2 kg (170 lb 3.1 oz)    SpO2 98%    BMI 27.47 kg/m2     Physical Exam   Constitutional: She is oriented to person, place, and time. She appears well-developed and well-nourished. Cardiovascular: Normal rate. Pulmonary/Chest: Effort normal.   Neurological: She is oriented to person, place, and time. She has normal strength. Gait normal.   Skin: Skin is warm and dry. Psychiatric: Her speech is normal.   Vitals reviewed. Neurologic Exam     Mental Status   Oriented to person, place, and time. Attention: normal.   Speech: speech is normal   Level of consciousness: alert    Cranial Nerves   Cranial nerves II through XII intact. Motor Exam   Muscle bulk: normal  Overall muscle tone: normal    Strength   Strength 5/5 throughout.      Sensory Exam   Light touch normal.     Gait, Coordination, and Reflexes     Gait  Gait: normal    Tremor   Resting tremor: absent           Lab Results  Component Value Date/Time   WBC 10.7 04/02/2017 06:54 PM   HGB 15.1 04/02/2017 06:54 PM   HCT 46.0 04/02/2017 06:54 PM   PLATELET 190 58/01/8604 06:54 PM   MCV 97.7 04/02/2017 06:54 PM       Lab Results  Component Value Date/Time   Hemoglobin A1c 6.8 04/02/2017 06:54 PM   Glucose 122 04/02/2017 06:54 PM   Glucose (POC) 122 04/03/2017 10:55 AM   LDL, calculated 81 04/02/2017 06:54 PM   Creatinine 1.30 04/02/2017 06:54 PM      Lab Results  Component Value Date/Time   Cholesterol, total 178 04/02/2017 06:54 PM   HDL Cholesterol 54 04/02/2017 06:54 PM   LDL, calculated 81 04/02/2017 06:54 PM   Triglyceride 215 04/02/2017 06:54 PM   CHOL/HDL Ratio 3.3 04/02/2017 06:54 PM       Lab Results  Component Value Date/Time   ALT (SGPT) 26 04/02/2017 06:54 PM   AST (SGOT) 15 04/02/2017 06:54 PM   Alk. phosphatase 75 04/02/2017 06:54 PM   Bilirubin, total 0.5 04/02/2017 06:54 PM       Lab Results  Component Value Date/Time   TSH 1.69 04/02/2017 06:54 PM         CT Results (maximum last 3): Results from [place] encounter on 04/02/17   CT CODE NEURO HEAD WO CONTRAST   Narrative EXAM:  CT CODE NEURO HEAD WO CONTRAST    INDICATION:   right leg weakness    COMPARISON: None. TECHNIQUE: Unenhanced CT of the head was performed using 5 mm images. Brain and  bone windows were generated. CT dose reduction was achieved through use of a  standardized protocol tailored for this examination and automatic exposure  control for dose modulation. FINDINGS:  The ventricles and sulci are normal in size, shape and configuration and  midline. There is no significant white matter disease. There is no intracranial  hemorrhage, extra-axial collection, mass, mass effect or midline shift. The  basilar cisterns are open. No acute infarct is identified. The bone windows  demonstrate no abnormalities. The visualized portions of the paranasal sinuses  and mastoid air cells are clear. Impression IMPRESSION: Unremarkable head CT            MRI Results (maximum last 3): No results found for this or any previous visit. VAS/US/Carotid Doppler Results (maximum last 3): Results from [place] encounter on 04/02/17   DUPLEX CAROTID BILATERAL    PET Results (maximum last 3): No results found for this or any previous visit. Assessment and Plan        [de-identified]year-old woman with transient recurrent right leg weakness concerning for an ischemic event.   There may be a small focus of restricted diffusion in the left frontal lobe correlating. Official report is neg. Regardless, I would recommend changing aspirin to Plavix 75 mg monotherapy. Cardiology evaluation for the TTE showing intraventricular conduction delay. Consideration for prolonged telemetry looking for A. Fib. Continue statin therapy. Once she is medically cleared I would anticipate discharge. During this evaluation, we also discussed stroke education to include signs and symptoms of stroke and TIA.        [facility],   NEUROLOGIST  Diplomate ABPN  4/3/2017

## 2017-04-03 NOTE — PROGRESS NOTES
Admission Medication Reconciliation:    Information obtained from: patient    Significant PMH/Disease States:   Past Medical History:   Diagnosis Date    Hypertension     Ill-defined condition     high cholesterol       Chief Complaint for this Admission:  weakness    Allergies:  Sulfa (sulfonamide antibiotics)    Prior to Admission Medications:   Prior to Admission Medications   Prescriptions Last Dose Informant Patient Reported? Taking? LORazepam (ATIVAN) 0.5 mg tablet  Self Yes Yes   Sig: Take 0.5 mg by mouth two (2) times daily as needed for Anxiety. aspirin delayed-release 81 mg tablet 4/2/2017 at Unknown time Self Yes Yes   Sig: Take 81 mg by mouth daily. cholecalciferol, VITAMIN D3, (VITAMIN D3) 5,000 unit tab tablet 4/2/2017 at am Self Yes Yes   Sig: Take 5,000 Units by mouth daily. hydroCHLOROthiazide (HYDRODIURIL) 25 mg tablet 4/2/2017 at Unknown time Self Yes Yes   Sig: Take 25 mg by mouth daily. metFORMIN (GLUCOPHAGE) 500 mg tablet 4/2/2017 at Unknown time Self Yes Yes   Sig: Take 500 mg by mouth daily. simvastatin (ZOCOR) 80 mg tablet 4/1/2017 at hs Self Yes Yes   Sig: Take 80 mg by mouth nightly. valsartan (DIOVAN) 80 mg tablet 4/2/2017 at am Self Yes Yes   Sig: Take 80 mg by mouth daily. Indications: hypertension      Facility-Administered Medications: None   Comments/Recommendations: Medication review done with patient and family member. Added aspirin, simvastatin, valsartan, hctz, lorazepam, and vitamin D  Patient has taken all her daily meds today except simvastatin  Allergies reviewed.

## 2017-04-03 NOTE — PROGRESS NOTES
Occupational Therapy Note  4/3/2017    Orders acknowledged, chart reviewed. Spoke with RN who reports pt with no neurological symptoms, RLE weakness has resolved and pt is at functional baseline with ADLs and mobility. Per PT evaluation, pt functional mobility is baseline. No acute OT needs at this time; please re-consult should neurological symptoms arise that impede pt's independence with ADLs and functional transfers/mobility. Thank you.     Shellie Posadas, OTR/L

## 2017-08-31 ENCOUNTER — HOSPITAL ENCOUNTER (OUTPATIENT)
Dept: MAMMOGRAPHY | Age: 82
Discharge: HOME | End: 2017-08-31
Attending: FAMILY MEDICINE
Payer: MEDICARE

## 2017-08-31 DIAGNOSIS — Z12.31 VISIT FOR SCREENING MAMMOGRAM: ICD-10-CM

## 2017-08-31 PROCEDURE — 77067 SCR MAMMO BI INCL CAD: CPT

## 2018-09-04 ENCOUNTER — HOSPITAL ENCOUNTER (OUTPATIENT)
Dept: MAMMOGRAPHY | Age: 83
Discharge: HOME | End: 2018-09-04
Attending: FAMILY MEDICINE
Payer: MEDICARE

## 2018-09-04 DIAGNOSIS — Z12.31 VISIT FOR SCREENING MAMMOGRAM: ICD-10-CM

## 2018-09-04 PROCEDURE — 77067 SCR MAMMO BI INCL CAD: CPT

## 2019-07-10 ENCOUNTER — HOSPITAL ENCOUNTER (OUTPATIENT)
Dept: GENERAL RADIOLOGY | Age: 84
Discharge: HOME | End: 2019-07-10
Payer: MEDICARE

## 2019-07-10 DIAGNOSIS — J18.9 PNEUMONIA: ICD-10-CM

## 2019-07-10 PROCEDURE — 71046 X-RAY EXAM CHEST 2 VIEWS: CPT

## 2019-12-30 ENCOUNTER — HOSPITAL ENCOUNTER (OUTPATIENT)
Dept: MAMMOGRAPHY | Age: 84
Discharge: HOME | End: 2019-12-30
Attending: FAMILY MEDICINE
Payer: MEDICARE

## 2019-12-30 DIAGNOSIS — Z12.31 VISIT FOR SCREENING MAMMOGRAM: ICD-10-CM

## 2019-12-30 PROCEDURE — 77067 SCR MAMMO BI INCL CAD: CPT

## 2020-12-15 ENCOUNTER — TRANSCRIBE ORDER (OUTPATIENT)
Dept: SCHEDULING | Age: 85
End: 2020-12-15

## 2020-12-15 DIAGNOSIS — Z12.31 SCREENING MAMMOGRAM, ENCOUNTER FOR: Primary | ICD-10-CM

## 2021-01-19 ENCOUNTER — HOSPITAL ENCOUNTER (OUTPATIENT)
Dept: MAMMOGRAPHY | Age: 86
Discharge: HOME | End: 2021-01-19
Attending: FAMILY MEDICINE
Payer: MEDICARE

## 2021-01-19 DIAGNOSIS — Z12.31 SCREENING MAMMOGRAM, ENCOUNTER FOR: ICD-10-CM

## 2021-01-19 PROCEDURE — 77067 SCR MAMMO BI INCL CAD: CPT

## 2021-01-22 ENCOUNTER — HOSPITAL ENCOUNTER (OUTPATIENT)
Dept: GENERAL RADIOLOGY | Age: 86
Discharge: HOME | End: 2021-01-22
Payer: MEDICARE

## 2021-01-22 ENCOUNTER — TRANSCRIBE ORDER (OUTPATIENT)
Dept: REGISTRATION | Age: 86
End: 2021-01-22

## 2021-01-22 DIAGNOSIS — R06.02 SOB (SHORTNESS OF BREATH): ICD-10-CM

## 2021-01-22 DIAGNOSIS — R06.02 SOB (SHORTNESS OF BREATH): Primary | ICD-10-CM

## 2021-01-22 PROCEDURE — 71046 X-RAY EXAM CHEST 2 VIEWS: CPT

## 2022-03-18 PROBLEM — G45.1 HEMISPHERIC CAROTID ARTERY SYNDROME: Status: ACTIVE | Noted: 2017-04-02

## 2022-03-19 PROBLEM — G45.9 TIA (TRANSIENT ISCHEMIC ATTACK): Status: ACTIVE | Noted: 2017-04-03

## 2023-05-10 RX ORDER — HYDROCHLOROTHIAZIDE 25 MG/1
25 TABLET ORAL DAILY
COMMUNITY

## 2023-05-10 RX ORDER — LORAZEPAM 0.5 MG/1
TABLET ORAL 2 TIMES DAILY PRN
COMMUNITY

## 2023-05-10 RX ORDER — CLOPIDOGREL BISULFATE 75 MG/1
TABLET ORAL DAILY
COMMUNITY
Start: 2017-04-03

## 2023-05-10 RX ORDER — VALSARTAN 80 MG/1
TABLET ORAL DAILY
COMMUNITY

## 2023-05-10 RX ORDER — SIMVASTATIN 80 MG
80 TABLET ORAL NIGHTLY
COMMUNITY